# Patient Record
Sex: MALE | Race: BLACK OR AFRICAN AMERICAN | NOT HISPANIC OR LATINO | Employment: UNEMPLOYED | URBAN - METROPOLITAN AREA
[De-identification: names, ages, dates, MRNs, and addresses within clinical notes are randomized per-mention and may not be internally consistent; named-entity substitution may affect disease eponyms.]

---

## 2020-04-26 ENCOUNTER — HOSPITAL ENCOUNTER (EMERGENCY)
Facility: MEDICAL CENTER | Age: 35
End: 2020-04-27
Attending: EMERGENCY MEDICINE
Payer: COMMERCIAL

## 2020-04-26 ENCOUNTER — APPOINTMENT (OUTPATIENT)
Dept: RADIOLOGY | Facility: MEDICAL CENTER | Age: 35
End: 2020-04-26
Attending: EMERGENCY MEDICINE
Payer: COMMERCIAL

## 2020-04-26 DIAGNOSIS — L03.019 FELON OF FINGER: ICD-10-CM

## 2020-04-26 DIAGNOSIS — R44.3 HALLUCINATIONS: ICD-10-CM

## 2020-04-26 LAB
ALBUMIN SERPL BCP-MCNC: 4 G/DL (ref 3.2–4.9)
ALBUMIN/GLOB SERPL: 1.5 G/DL
ALP SERPL-CCNC: 68 U/L (ref 30–99)
ALT SERPL-CCNC: 17 U/L (ref 2–50)
ANION GAP SERPL CALC-SCNC: 13 MMOL/L (ref 7–16)
AST SERPL-CCNC: 19 U/L (ref 12–45)
BASOPHILS # BLD AUTO: 0.3 % (ref 0–1.8)
BASOPHILS # BLD: 0.03 K/UL (ref 0–0.12)
BILIRUB SERPL-MCNC: 0.4 MG/DL (ref 0.1–1.5)
BUN SERPL-MCNC: 10 MG/DL (ref 8–22)
CALCIUM SERPL-MCNC: 8.8 MG/DL (ref 8.5–10.5)
CHLORIDE SERPL-SCNC: 99 MMOL/L (ref 96–112)
CO2 SERPL-SCNC: 23 MMOL/L (ref 20–33)
CREAT SERPL-MCNC: 0.73 MG/DL (ref 0.5–1.4)
EOSINOPHIL # BLD AUTO: 0.05 K/UL (ref 0–0.51)
EOSINOPHIL NFR BLD: 0.5 % (ref 0–6.9)
ERYTHROCYTE [DISTWIDTH] IN BLOOD BY AUTOMATED COUNT: 40.8 FL (ref 35.9–50)
ETHANOL BLD-MCNC: <10.1 MG/DL (ref 0–10.1)
GLOBULIN SER CALC-MCNC: 2.7 G/DL (ref 1.9–3.5)
GLUCOSE SERPL-MCNC: 137 MG/DL (ref 65–99)
HCT VFR BLD AUTO: 45.4 % (ref 42–52)
HGB BLD-MCNC: 14.7 G/DL (ref 14–18)
IMM GRANULOCYTES # BLD AUTO: 0.04 K/UL (ref 0–0.11)
IMM GRANULOCYTES NFR BLD AUTO: 0.4 % (ref 0–0.9)
LACTATE BLD-SCNC: 1.1 MMOL/L (ref 0.5–2)
LYMPHOCYTES # BLD AUTO: 0.93 K/UL (ref 1–4.8)
LYMPHOCYTES NFR BLD: 9 % (ref 22–41)
MCH RBC QN AUTO: 27.9 PG (ref 27–33)
MCHC RBC AUTO-ENTMCNC: 32.4 G/DL (ref 33.7–35.3)
MCV RBC AUTO: 86.1 FL (ref 81.4–97.8)
MONOCYTES # BLD AUTO: 0.96 K/UL (ref 0–0.85)
MONOCYTES NFR BLD AUTO: 9.3 % (ref 0–13.4)
NEUTROPHILS # BLD AUTO: 8.29 K/UL (ref 1.82–7.42)
NEUTROPHILS NFR BLD: 80.5 % (ref 44–72)
NRBC # BLD AUTO: 0 K/UL
NRBC BLD-RTO: 0 /100 WBC
PLATELET # BLD AUTO: 264 K/UL (ref 164–446)
PMV BLD AUTO: 8.8 FL (ref 9–12.9)
POTASSIUM SERPL-SCNC: 3.9 MMOL/L (ref 3.6–5.5)
PROT SERPL-MCNC: 6.7 G/DL (ref 6–8.2)
RBC # BLD AUTO: 5.27 M/UL (ref 4.7–6.1)
SODIUM SERPL-SCNC: 135 MMOL/L (ref 135–145)
WBC # BLD AUTO: 10.3 K/UL (ref 4.8–10.8)

## 2020-04-26 PROCEDURE — 85025 COMPLETE CBC W/AUTO DIFF WBC: CPT

## 2020-04-26 PROCEDURE — 90791 PSYCH DIAGNOSTIC EVALUATION: CPT

## 2020-04-26 PROCEDURE — 87040 BLOOD CULTURE FOR BACTERIA: CPT

## 2020-04-26 PROCEDURE — 36415 COLL VENOUS BLD VENIPUNCTURE: CPT

## 2020-04-26 PROCEDURE — 80053 COMPREHEN METABOLIC PANEL: CPT

## 2020-04-26 PROCEDURE — 96365 THER/PROPH/DIAG IV INF INIT: CPT

## 2020-04-26 PROCEDURE — 99285 EMERGENCY DEPT VISIT HI MDM: CPT

## 2020-04-26 PROCEDURE — 71045 X-RAY EXAM CHEST 1 VIEW: CPT

## 2020-04-26 PROCEDURE — 700101 HCHG RX REV CODE 250

## 2020-04-26 PROCEDURE — 303977 HCHG I & D

## 2020-04-26 PROCEDURE — 303485 HCHG DRESSING MEDIUM

## 2020-04-26 PROCEDURE — 96375 TX/PRO/DX INJ NEW DRUG ADDON: CPT

## 2020-04-26 PROCEDURE — 80307 DRUG TEST PRSMV CHEM ANLYZR: CPT

## 2020-04-26 PROCEDURE — 83605 ASSAY OF LACTIC ACID: CPT

## 2020-04-26 PROCEDURE — 90715 TDAP VACCINE 7 YRS/> IM: CPT | Performed by: EMERGENCY MEDICINE

## 2020-04-26 PROCEDURE — 700105 HCHG RX REV CODE 258: Performed by: EMERGENCY MEDICINE

## 2020-04-26 PROCEDURE — 90471 IMMUNIZATION ADMIN: CPT

## 2020-04-26 PROCEDURE — 700111 HCHG RX REV CODE 636 W/ 250 OVERRIDE (IP): Performed by: EMERGENCY MEDICINE

## 2020-04-26 RX ORDER — LIDOCAINE HYDROCHLORIDE 10 MG/ML
20 INJECTION, SOLUTION INFILTRATION; PERINEURAL ONCE
Status: COMPLETED | OUTPATIENT
Start: 2020-04-26 | End: 2020-04-26

## 2020-04-26 RX ORDER — KETOROLAC TROMETHAMINE 30 MG/ML
15 INJECTION, SOLUTION INTRAMUSCULAR; INTRAVENOUS ONCE
Status: COMPLETED | OUTPATIENT
Start: 2020-04-26 | End: 2020-04-26

## 2020-04-26 RX ORDER — LIDOCAINE HYDROCHLORIDE 10 MG/ML
INJECTION, SOLUTION INFILTRATION; PERINEURAL
Status: COMPLETED
Start: 2020-04-26 | End: 2020-04-26

## 2020-04-26 RX ORDER — CEPHALEXIN 500 MG/1
500 CAPSULE ORAL 4 TIMES DAILY
Qty: 20 CAP | Refills: 0 | Status: SHIPPED | OUTPATIENT
Start: 2020-04-26 | End: 2020-05-01

## 2020-04-26 RX ADMIN — KETOROLAC TROMETHAMINE 15 MG: 30 INJECTION, SOLUTION INTRAMUSCULAR at 22:10

## 2020-04-26 RX ADMIN — LIDOCAINE HYDROCHLORIDE 20 ML: 10 INJECTION, SOLUTION INFILTRATION; PERINEURAL at 22:57

## 2020-04-26 RX ADMIN — AMPICILLIN SODIUM AND SULBACTAM SODIUM 3 G: 2; 1 INJECTION, POWDER, FOR SOLUTION INTRAMUSCULAR; INTRAVENOUS at 22:10

## 2020-04-26 RX ADMIN — CLOSTRIDIUM TETANI TOXOID ANTIGEN (FORMALDEHYDE INACTIVATED), CORYNEBACTERIUM DIPHTHERIAE TOXOID ANTIGEN (FORMALDEHYDE INACTIVATED), BORDETELLA PERTUSSIS TOXOID ANTIGEN (GLUTARALDEHYDE INACTIVATED), BORDETELLA PERTUSSIS FILAMENTOUS HEMAGGLUTININ ANTIGEN (FORMALDEHYDE INACTIVATED), BORDETELLA PERTUSSIS PERTACTIN ANTIGEN, AND BORDETELLA PERTUSSIS FIMBRIAE 2/3 ANTIGEN 0.5 ML: 5; 2; 2.5; 5; 3; 5 INJECTION, SUSPENSION INTRAMUSCULAR at 22:10

## 2020-04-26 SDOH — HEALTH STABILITY: MENTAL HEALTH: HOW MANY STANDARD DRINKS CONTAINING ALCOHOL DO YOU HAVE ON A TYPICAL DAY?: 3 OR 4

## 2020-04-26 SDOH — HEALTH STABILITY: MENTAL HEALTH: HOW OFTEN DO YOU HAVE A DRINK CONTAINING ALCOHOL?: MONTHLY OR LESS

## 2020-04-26 SDOH — HEALTH STABILITY: MENTAL HEALTH: HOW OFTEN DO YOU HAVE 6 OR MORE DRINKS ON ONE OCCASION?: LESS THAN MONTHLY

## 2020-04-26 ASSESSMENT — LIFESTYLE VARIABLES: DO YOU DRINK ALCOHOL: NO

## 2020-04-27 VITALS
SYSTOLIC BLOOD PRESSURE: 111 MMHG | HEART RATE: 70 BPM | WEIGHT: 196.65 LBS | BODY MASS INDEX: 29.13 KG/M2 | RESPIRATION RATE: 16 BRPM | OXYGEN SATURATION: 98 % | HEIGHT: 69 IN | TEMPERATURE: 97.9 F | DIASTOLIC BLOOD PRESSURE: 74 MMHG

## 2020-04-27 LAB
AMPHET UR QL SCN: POSITIVE
APPEARANCE UR: CLEAR
BARBITURATES UR QL SCN: NEGATIVE
BENZODIAZ UR QL SCN: NEGATIVE
BILIRUB UR QL STRIP.AUTO: NEGATIVE
BZE UR QL SCN: NEGATIVE
CANNABINOIDS UR QL SCN: POSITIVE
COLOR UR: YELLOW
GLUCOSE UR STRIP.AUTO-MCNC: NEGATIVE MG/DL
KETONES UR STRIP.AUTO-MCNC: NEGATIVE MG/DL
LEUKOCYTE ESTERASE UR QL STRIP.AUTO: NEGATIVE
METHADONE UR QL SCN: NEGATIVE
MICRO URNS: NORMAL
NITRITE UR QL STRIP.AUTO: NEGATIVE
OPIATES UR QL SCN: NEGATIVE
OXYCODONE UR QL SCN: NEGATIVE
PCP UR QL SCN: NEGATIVE
PH UR STRIP.AUTO: 6.5 [PH] (ref 5–8)
PROPOXYPH UR QL SCN: NEGATIVE
PROT UR QL STRIP: NEGATIVE MG/DL
RBC UR QL AUTO: NEGATIVE
SP GR UR STRIP.AUTO: 1.02
UROBILINOGEN UR STRIP.AUTO-MCNC: 1 MG/DL

## 2020-04-27 PROCEDURE — 80307 DRUG TEST PRSMV CHEM ANLYZR: CPT

## 2020-04-27 PROCEDURE — 700102 HCHG RX REV CODE 250 W/ 637 OVERRIDE(OP): Performed by: EMERGENCY MEDICINE

## 2020-04-27 PROCEDURE — 700102 HCHG RX REV CODE 250 W/ 637 OVERRIDE(OP): Performed by: STUDENT IN AN ORGANIZED HEALTH CARE EDUCATION/TRAINING PROGRAM

## 2020-04-27 PROCEDURE — A9270 NON-COVERED ITEM OR SERVICE: HCPCS | Performed by: EMERGENCY MEDICINE

## 2020-04-27 PROCEDURE — 87086 URINE CULTURE/COLONY COUNT: CPT

## 2020-04-27 PROCEDURE — 99284 EMERGENCY DEPT VISIT MOD MDM: CPT | Mod: GC | Performed by: PSYCHIATRY & NEUROLOGY

## 2020-04-27 PROCEDURE — A9270 NON-COVERED ITEM OR SERVICE: HCPCS | Performed by: STUDENT IN AN ORGANIZED HEALTH CARE EDUCATION/TRAINING PROGRAM

## 2020-04-27 PROCEDURE — 81003 URINALYSIS AUTO W/O SCOPE: CPT

## 2020-04-27 RX ORDER — ARIPIPRAZOLE 10 MG/1
5 TABLET ORAL DAILY
Status: DISCONTINUED | OUTPATIENT
Start: 2020-04-27 | End: 2020-04-27 | Stop reason: HOSPADM

## 2020-04-27 RX ORDER — CEPHALEXIN 500 MG/1
500 CAPSULE ORAL 4 TIMES DAILY
Status: DISCONTINUED | OUTPATIENT
Start: 2020-04-27 | End: 2020-04-27 | Stop reason: HOSPADM

## 2020-04-27 RX ORDER — CEPHALEXIN 500 MG/1
500 CAPSULE ORAL ONCE
Status: COMPLETED | OUTPATIENT
Start: 2020-04-27 | End: 2020-04-27

## 2020-04-27 RX ADMIN — CEPHALEXIN 500 MG: 500 CAPSULE ORAL at 09:45

## 2020-04-27 RX ADMIN — CEPHALEXIN 500 MG: 500 CAPSULE ORAL at 02:05

## 2020-04-27 RX ADMIN — CEPHALEXIN 500 MG: 500 CAPSULE ORAL at 13:54

## 2020-04-27 RX ADMIN — ARIPIPRAZOLE 5 MG: 10 TABLET ORAL at 13:54

## 2020-04-27 ASSESSMENT — ENCOUNTER SYMPTOMS
HALLUCINATIONS: 1
DEPRESSION: 1
DIARRHEA: 0
NAUSEA: 0
FEVER: 0
BRUISES/BLEEDS EASILY: 0
SORE THROAT: 0
BLURRED VISION: 0
MYALGIAS: 0
COUGH: 0
HEADACHES: 1
NERVOUS/ANXIOUS: 0

## 2020-04-27 ASSESSMENT — LIFESTYLE VARIABLES: SUBSTANCE_ABUSE: 1

## 2020-04-27 NOTE — ED NOTES
Placed patient on o2 1L nc. Sat was down to 88-91% while resting. Woke patient up, had him take a few deep breathes. Sat up to 94-97% on room air, with o2 on patient sating at 100% at this time. rn aware   rn at bedside to give patient medication

## 2020-04-27 NOTE — DISCHARGE PLANNING
Medical Social Work    LSW received call from Lionel emanuel/ [facility] ([facility]) stating the pt has been accepted by Dr. Quiles. LSW called MTM and spoke w/ Bryce for transport authorization. PCS form and Facesheet faxed to [facility]. Transportation arranged w/ Johnny @ [facility] for 1530. LSW completed transfer packet and placed original LH in packet and placed on pt's chart. Bedside RN and [facility] notified of 1530 transport time.

## 2020-04-27 NOTE — DISCHARGE PLANNING
Medical Social Work    LSW called [facility] to follow up on bed availability and transfer time. Axel w/ [facility] states that they are aware that Dr. Klein recommended inpt at their facility. [facility] Admissions RN is reviewing the referral now and will discuss w/ their MD and call this LSW back.      LSW awaiting call back from [facility].

## 2020-04-27 NOTE — ED PROVIDER NOTES
ED Provider Note    ER PROVIDER NOTE        CHIEF COMPLAINT  Chief Complaint   Patient presents with   • Wound Check     happened 2 days ago, right thumb pain, patient splitting wood, got a splinter, removed splinter, drained wound yesterday. CMS intact, swelling noted.    • Medication Refill     would like refill of Abilify        \[facility] [place] [facility]\""  Kawn Perez is a 35 y.o. male who presents to the emergency department with multiple complaints.  Patient reports he had a splinter in his thumb a few days ago, he pulled the splinter out but over the past 2 days is noticed some increased swelling he tried to drain it himself to no avail.  He denies any fevers or chills.  He denies any other rash or lesion.  No cough congestion or shortness of breath.  No abdominal pain, nausea or vomiting.  He does inject methamphetamine as well.  Patient is also reporting he is hearing voices and people telling him to kill other people and kill himself.  He denies any visual hallucinations.  No headaches.     No recent travel or known high risk exposures although he is homeless    REVIEW OF SYSTEMS  Pertinent positives include thumb pain, auditory hallucinations. Pertinent negatives include no fever. See HPI for details. All other systems reviewed and are negative.    PAST MEDICAL HISTORY   has a past medical history of Chronic pain and Schizophrenia (HCC).    SURGICAL HISTORY  patient denies any surgical history    FAMILY HISTORY  Family History   Problem Relation Age of Onset   • Mult Sclerosis Mother    • Cancer Father    • Cancer Maternal Uncle    • Cancer Paternal Grandmother        SOCIAL HISTORY  Social History     Socioeconomic History   • Marital status: Not on file     Spouse name: Not on file   • Number of children: Not on file   • Years of education: Not on file   • Highest education level: Not on file   Occupational History   • Not on file   Social Needs   • Financial resource strain: Not on file   • Food insecurity     Worry: Not  "on file     Inability: Not on file   • Transportation needs     Medical: Not on file     Non-medical: Not on file   Tobacco Use   • Smoking status: Current Every Day Smoker     Packs/day: 0.10     Types: Cigarettes     Start date: 1/1/1999   • Smokeless tobacco: Never Used   Substance and Sexual Activity   • Alcohol use: Yes     Frequency: Monthly or less     Drinks per session: 3 or 4     Binge frequency: Less than monthly   • Drug use: Yes     Types: Intravenous, Inhaled     Comment: Meth (04/24/2020)   • Sexual activity: Not on file   Lifestyle   • Physical activity     Days per week: Not on file     Minutes per session: Not on file   • Stress: Not on file   Relationships   • Social connections     Talks on phone: Not on file     Gets together: Not on file     Attends Yarsani service: Not on file     Active member of club or organization: Not on file     Attends meetings of clubs or organizations: Not on file     Relationship status: Not on file   • Intimate partner violence     Fear of current or ex partner: Not on file     Emotionally abused: Not on file     Physically abused: Not on file     Forced sexual activity: Not on file   Other Topics Concern   • Not on file   Social History Narrative   • Not on file      Social History     Substance and Sexual Activity   Drug Use Yes   • Types: Intravenous, Inhaled    Comment: Meth (04/24/2020)       CURRENT MEDICATIONS  Home Medications     Reviewed by Ayo Ruiz R.N. (Registered Nurse) on 04/26/20 at 2050  Med List Status: Complete   Medication Last Dose Status        Patient Nagi Taking any Medications                       ALLERGIES  No Known Allergies    PHYSICAL EXAM  VITAL SIGNS: /83   Pulse 84   Temp 36.4 °C (97.6 °F) (Temporal)   Resp 16   Ht 1.753 m (5' 9\")   Wt 89.2 kg (196 lb 10.4 oz)   SpO2 99%   BMI 29.04 kg/m²   Pulse ox interpretation: I interpret this pulse ox as normal.    Constitutional: Alert in no apparent " "distress.  HENT: No signs of trauma, Bilateral external ears normal, Nose normal.   Eyes: Pupils are equal and reactive, Conjunctiva normal, Non-icteric.   Neck: Normal range of motion, No tenderness, Supple, No stridor.   Lymphatic: No lymphadenopathy noted.   Cardiovascular: Regular rate and rhythm, no murmurs.   Thorax & Lungs: Normal breath sounds, No respiratory distress, No wheezing, No chest tenderness.   Abdomen: Bowel sounds normal, Soft, No tenderness, No masses, No pulsatile masses. No peritoneal signs.  Skin: Warm, Dry, No erythema, No rash.   Back: No bony tenderness, No CVA tenderness.   Extremities: There is some swelling and tenderness to the distal volar fat pad of the right thumb, with slight erythema, no tenderness more proximally on the phalanx and patient is able to flex and extend without significant pain.  No proximal streaking noted or surrounding erythema intact distal pulses, No edema, No tenderness, No cyanosis,   Musculoskeletal: Good range of motion in all major joints. No tenderness to palpation or major deformities noted.   Neurologic: Alert , Normal motor function, Normal sensory function, No focal deficits noted.   Psychiatric: Patient is somewhat guarded, anxious appearing    DIAGNOSTIC STUDIES / PROCEDURES        LABS  Labs Reviewed   CBC WITH DIFFERENTIAL - Abnormal; Notable for the following components:       Result Value    MCHC 32.4 (*)     MPV 8.8 (*)     Neutrophils-Polys 80.50 (*)     Lymphocytes 9.00 (*)     Neutrophils (Absolute) 8.29 (*)     Lymphs (Absolute) 0.93 (*)     Monos (Absolute) 0.96 (*)     All other components within normal limits   COMP METABOLIC PANEL - Abnormal; Notable for the following components:    Glucose 137 (*)     All other components within normal limits   LACTIC ACID   BLOOD CULTURE    Narrative:     Per Hospital Policy: Only change Specimen Src: to \"Line\" if  specified by physician order.   BLOOD CULTURE    Narrative:     Per Hospital Policy: " "Only change Specimen Src: to \"Line\" if  specified by physician order.   DIAGNOSTIC ALCOHOL   ESTIMATED GFR   URINE DRUG SCREEN   URINALYSIS   URINE CULTURE(NEW)       All labs reviewed by me.    RADIOLOGY  DX-CHEST-PORTABLE (1 VIEW)   Final Result      No acute cardiopulmonary disease.        The radiologist's interpretation of all radiological studies have been reviewed by me.    COURSE & MEDICAL DECISION MAKING  Nursing notes, VS, PMSFHx reviewed in chart.    9:37 PM Patient seen and examined at bedside. Patient will be treated with Unasyn, IV fluid, ketorolac. Ordered for sepsis bundle to evaluate his symptoms.     11:07 PM  INCISION AND DRAINAGE PROCEDURE NOTE:  Patient identification was confirmed and consent was obtained.    Site: Right thumb felon  Sterile procedures observed  Needle size: 27  Anesthetic used (type and amt): Lidocaine 1% without epinephrine  Blade size: 11  Drainage: Few cc purulent    Site anesthetized, incision made over site laterally, wound drained rinsed with copious amounts of normal saline, covered with dry, sterile dressing. Pt tolerated procedure well without complications. Instructions for care discussed verbally and pt provided with additional written instructions for homecare and f/u.      This patient was cared for during the COVID-19 pandemic.  History and physical exam may be limited/truncated by the inherent challenges of PPE and the need to decrease staff exposure to novel coronavirus.  Some aspects of disease management may be different to protect staff and help slow the spread of disease. I verified that, if possible, the patient was wearing a mask and I was wearing appropriate PPE every time I encountered the patient.     Current [facility] COVID19 protocol followed      HYDRATION: Based on the patient's presentation of Tachycardia the patient was given IV fluids. IV Hydration was used because oral hydration was not as rapid as required. Upon recheck following hydration, the " patient was Improved.    Decision Making:  This is a 35 y.o. male presenting with finger infection as well as hallucinations and suicidal thoughts.  His finger infection is consistent with felon, this was drained as above, he was given a dose of IV antibiotics here in the emergency department and will be sent home with Keflex as well.  No findings to suggest sepsis or more systemic involvement, no leukocytosis in the infection does appear localized without findings to suggest flexor tenosynovitis, negative Knavels.  Patient is however suicidal with hallucination does appear decompensated from a mental health perspective.  A mental health hold has been placed and he is pending transfer to inpatient psychiatric facility    Patient's care will be transferred to the oncoming emergency physician    FINAL IMPRESSION  1. Hallucinations    2. Felon of finger         The note accurately reflects work and decisions made by me.  Gume Bruner M.D.  4/27/2020  1:09 AM

## 2020-04-27 NOTE — ED NOTES
"Patient's home medications have been reviewed by the pharmacy team.     Past Medical History:   Diagnosis Date   • Chronic pain    • Schizophrenia (HCC)        Patient's Medications   New Prescriptions    CEPHALEXIN (KEFLEX) 500 MG CAP    Take 1 Cap by mouth 4 times a day for 5 days.   Previous Medications    No medications on file   Modified Medications    No medications on file   Discontinued Medications    No medications on file          A:  Patient denies taking medications in \"months.\"  Presented to ED requesting refill of Abilify.    P:    No recommendations at this time. Follow for Psychiatry assessment and plan.      Gege Lima, Pharm.D., BCPS              "

## 2020-04-27 NOTE — ED NOTES
Pt resting comfortably in bed. Respirations even and unlabored. Pt in no apparent distress at this time. Sitter outside room for continuous 1:1 observation. Will continue to monitor.

## 2020-04-27 NOTE — ED NOTES
Patient medicated per MAR. Assisted into position of comfort on [facility]. Reminded of need for urine specimen per orders - states that he does not need to urinate at this time - urinal remains at bedside. Denies needs. 1:1 sitter remains at bedside.
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
## 2020-04-27 NOTE — DISCHARGE PLANNING
Alert Team:    Discussed low risk for HI security due to thoughts of harm toward individuals outside of [facility] and no specific plan noted or access to firearms for further f/u by authorities. Sitter will be placed outside of room for active SI.

## 2020-04-27 NOTE — DISCHARGE PLANNING
Medical Social Work    Referral: Legal Hold    Intervention: Legal Hold Paperwork given to SW by [employer] RN: Bambi    Legal Hold Initiated: Date: 04/26/2020  Time: 2202    Legal Hold faxed: Date: 04/27/2020  Time: 2043    Patient’s Insurance Listed on Face Sheet: [place] [employer]    Referrals sent to: [facility] per Dr. Klein    Plan: Patient will transfer to mental health facility once acceptance is obtained.
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
## 2020-04-27 NOTE — DISCHARGE PLANNING
Alert Team:    Called requesting [facility] mobile assessment.    Spoke w/ Dr. Klein at [facility] and approved for transfer to [facility] for further psychiatric stabilization.
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
## 2020-04-27 NOTE — ED PROVIDER NOTES
ED PROVIDER NOTE    Scribed for Surendra Middleton M.D. by Rosalina Vogt. 4/27/2020, 6:20 AM.    This is an addendum to the note on Kwan Perez. For further details and full chart entry, see the previously signed ED Provider Note written by Dr. Gume Bruner (ERP).      6:00 AM  - I discussed the patient's case with Dr. Shukri Larry (ERP) who will transfer care of the patient to me at this time.        6:21 AM - Patient was reevaluated at bedside. The patient is resting comfortably in bed, in no acute distress, lungs are clear, heart is RRR, patient is eating and drinking well. Will continue to monitor. Patient is pending psychiatric transfer.        FLORENCIO, Rosalina Vogt (Scribe), am scribing for, and in the presence of, Surendra Middleton M.D..    Electronically signed by: Rosalina Vogt (Scribe), 4/27/2020    Surendra MATIAS M.D. personally performed the services described in this documentation, as scribed by Rosalina Vogt in my presence, and it is both accurate and complete.    The note accurately reflects work and decisions made by me.  Surendra Middleton M.D.  4/27/2020  6:34 AM

## 2020-04-27 NOTE — ED TRIAGE NOTES
"Kwan Perez   35 y.o. male     Chief Complaint   Patient presents with   • Wound Check     happened 2 days ago, right thumb pain, patient splitting wood, got a splinter, removed splinter, drained wound yesterday. CMS intact, swelling noted.    • Medication Refill     would like refill of Abilify       Pt amb to triage with steady gait for above complaint.     Patient states SI/HI     Pt is alert and oriented, speaking in full sentences, follows commands and responds appropriately to questions. NAD. Resp are even and unlabored.   Pt placed in lobby. Pt educated on triage process. Pt encouraged to alert staff for any changes.    /94   Pulse 96   Temp 36.4 °C (97.6 °F) (Temporal)   Resp 16   Ht 1.753 m (5' 9\")   Wt 89.2 kg (196 lb 10.4 oz)   SpO2 96%   BMI 29.04 kg/m²     "

## 2020-04-27 NOTE — DISCHARGE PLANNING
Update:    LSW followed up w/ [facility] and per Romain w/ [facility] they are waiting for the MD to review the referral.

## 2020-04-27 NOTE — ED NOTES
Med Rec completed per patient   Allergies reviewed  No ORAL antibiotics in last 14 days    Patient hasn't taken any prescription medications in months

## 2020-04-29 LAB
BACTERIA UR CULT: NORMAL
SIGNIFICANT IND 70042: NORMAL
SITE SITE: NORMAL
SOURCE SOURCE: NORMAL

## 2020-05-01 LAB
BACTERIA BLD CULT: NORMAL
BACTERIA BLD CULT: NORMAL
SIGNIFICANT IND 70042: NORMAL
SIGNIFICANT IND 70042: NORMAL
SITE SITE: NORMAL
SITE SITE: NORMAL
SOURCE SOURCE: NORMAL
SOURCE SOURCE: NORMAL
